# Patient Record
Sex: FEMALE | Race: OTHER | NOT HISPANIC OR LATINO | URBAN - METROPOLITAN AREA
[De-identification: names, ages, dates, MRNs, and addresses within clinical notes are randomized per-mention and may not be internally consistent; named-entity substitution may affect disease eponyms.]

---

## 2019-05-17 ENCOUNTER — EMERGENCY (EMERGENCY)
Facility: HOSPITAL | Age: 79
LOS: 1 days | Discharge: HOME | End: 2019-05-17
Attending: EMERGENCY MEDICINE | Admitting: EMERGENCY MEDICINE
Payer: MEDICARE

## 2019-05-17 VITALS
RESPIRATION RATE: 16 BRPM | SYSTOLIC BLOOD PRESSURE: 125 MMHG | HEIGHT: 58 IN | OXYGEN SATURATION: 95 % | WEIGHT: 139.99 LBS | HEART RATE: 59 BPM | TEMPERATURE: 98 F | DIASTOLIC BLOOD PRESSURE: 69 MMHG

## 2019-05-17 PROCEDURE — 99282 EMERGENCY DEPT VISIT SF MDM: CPT

## 2019-05-17 RX ORDER — ACETAMINOPHEN 500 MG
2 TABLET ORAL
Qty: 60 | Refills: 0
Start: 2019-05-17 | End: 2019-05-26

## 2019-05-17 RX ORDER — ACETAMINOPHEN 500 MG
650 TABLET ORAL ONCE
Refills: 0 | Status: COMPLETED | OUTPATIENT
Start: 2019-05-17 | End: 2019-05-17

## 2019-05-17 RX ORDER — SIMVASTATIN 20 MG/1
1 TABLET, FILM COATED ORAL
Qty: 0 | Refills: 0 | DISCHARGE

## 2019-05-17 RX ORDER — ASPIRIN/CALCIUM CARB/MAGNESIUM 324 MG
1 TABLET ORAL
Qty: 0 | Refills: 0 | DISCHARGE

## 2019-05-17 RX ADMIN — Medication 650 MILLIGRAM(S): at 15:32

## 2019-05-17 RX ADMIN — Medication 650 MILLIGRAM(S): at 15:15

## 2019-05-17 NOTE — ED ADULT TRIAGE NOTE - CHIEF COMPLAINT QUOTE
Pt w hx of arthritis c/o worsening B/L knee pain, R >L. Per daughter pt has been receiving injections , last one last month "but they don't work any longer'

## 2019-05-17 NOTE — ED PROVIDER NOTE - NSFOLLOWUPINSTRUCTIONS_ED_ALL_ED_FT
Osteoarthritis  Image   Osteoarthritis is a type of arthritis that affects tissue that covers the ends of bones in joints (cartilage). Cartilage acts as a cushion between the bones and helps them move smoothly. Osteoarthritis results when cartilage in the joints gets worn down. Osteoarthritis is sometimes called "wear and tear" arthritis.    Osteoarthritis is the most common form of arthritis. It often occurs in older people. It is a condition that gets worse over time (a progressive condition). Joints that are most often affected by this condition are in:  Fingers.  Toes.  Hips.  Knees.  Spine, including neck and lower back.  What are the causes?  This condition is caused by age-related wearing down of cartilage that covers the ends of bones.    What increases the risk?  The following factors may make you more likely to develop this condition:  Older age.  Being overweight or obese.  Overuse of joints, such as in athletes.  Past injury of a joint.  Past surgery on a joint.  Family history of osteoarthritis.  What are the signs or symptoms?  The main symptoms of this condition are pain, swelling, and stiffness in the joint. The joint may lose its shape over time. Small pieces of bone or cartilage may break off and float inside of the joint, which may cause more pain and damage to the joint. Small deposits of bone (osteophytes) may grow on the edges of the joint. Other symptoms may include:  A grating or scraping feeling inside the joint when you move it.  Popping or creaking sounds when you move.  Symptoms may affect one or more joints. Osteoarthritis in a major joint, such as your knee or hip, can make it painful to walk or exercise. If you have osteoarthritis in your hands, you might not be able to  items, twist your hand, or control small movements of your hands and fingers (fine motor skills).    How is this diagnosed?  This condition may be diagnosed based on:  Your medical history.  A physical exam.  Your symptoms.  X-rays of the affected joint(s).  Blood tests to rule out other types of arthritis.  How is this treated?  There is no cure for this condition, but treatment can help to control pain and improve joint function. Treatment plans may include:  A prescribed exercise program that allows for rest and joint relief. You may work with a physical therapist.  A weight control plan.  Pain relief techniques, such as:  Applying heat and cold to the joint.  Electric pulses delivered to nerve endings under the skin (transcutaneous electrical nerve stimulation, or TENS).  Massage.  Certain nutritional supplements.  NSAIDs or prescription medicines to help relieve pain.  Medicine to help relieve pain and inflammation (corticosteroids). This can be given by mouth (orally) or as an injection.  Assistive devices, such as a brace, wrap, splint, specialized glove, or cane.  Surgery, such as:  An osteotomy. This is done to reposition the bones and relieve pain or to remove loose pieces of bone and cartilage.  Joint replacement surgery. You may need this surgery if you have very bad (advanced) osteoarthritis.  Follow these instructions at home:  Activity     Rest your affected joints as directed by your health care provider.  Do not drive or use heavy machinery while taking prescription pain medicine.  Exercise as directed. Your health care provider or physical therapist may recommend specific types of exercise, such as:  Strengthening exercises. These are done to strengthen the muscles that support joints that are affected by arthritis. They can be performed with weights or with exercise bands to add resistance.  Aerobic activities. These are exercises, such as brisk walking or water aerobics, that get your heart pumping.  Range-of-motion activities. These keep your joints easy to move.  Balance and agility exercises.  Managing pain, stiffness, and swelling     Image Image   If directed, apply heat to the affected area as often as told by your health care provider. Use the heat source that your health care provider recommends, such as a moist heat pack or a heating pad.  If you have a removable assistive device, remove it as told by your health care provider.  Place a towel between your skin and the heat source. If your health care provider tells you to keep the assistive device on while you apply heat, place a towel between the assistive device and the heat source.  Leave the heat on for 20–30 minutes.  Remove the heat if your skin turns bright red. This is especially important if you are unable to feel pain, heat, or cold. You may have a greater risk of getting burned.  If directed, put ice on the affected joint:  If you have a removable assistive device, remove it as told by your health care provider.  Put ice in a plastic bag.  Place a towel between your skin and the bag. If your health care provider tells you to keep the assistive device on during icing, place a towel between the assistive device and the bag.  Leave the ice on for 20 minutes, 2–3 times a day.  General instructions     Take over-the-counter and prescription medicines only as told by your health care provider.  Maintain a healthy weight. Follow instructions from your health care provider for weight control. These may include dietary restrictions.  Do not use any products that contain nicotine or tobacco, such as cigarettes and e-cigarettes. These can delay bone healing. If you need help quitting, ask your health care provider.  Use assistive devices as directed by your health care provider.  Keep all follow-up visits as told by your health care provider. This is important.  Where to find more information  [employer] [place] of Arthritis and Musculoskeletal and Skin Diseases: www.niams.nih.gov  [employer] [place] on Aging: www.zhou.nih.gov  American College of Rheumatology: www.rheumatology.org  Contact a health care provider if:  Your skin turns red.  You develop a rash.  You have pain that gets worse.  You have a fever along with joint or muscle aches.  Get help right away if:  You lose a lot of weight.  You suddenly lose your appetite.  You have night sweats.  Summary  Osteoarthritis is a type of arthritis that affects tissue covering the ends of bones in joints (cartilage).  This condition is caused by age-related wearing down of cartilage that covers the ends of bones.  The main symptom of this condition is pain, swelling, and stiffness in the joint.  There is no cure for this condition, but treatment can help to control pain and improve joint function.  This information is not intended to replace advice given to you by your health care provider. Make sure you discuss any questions you have with your health care provider. Please see referral to orthopedics for followup. Call for appointment.  Return to the ER if symptoms worsen or other concerns.    Osteoarthritis  Image   Osteoarthritis is a type of arthritis that affects tissue that covers the ends of bones in joints (cartilage). Cartilage acts as a cushion between the bones and helps them move smoothly. Osteoarthritis results when cartilage in the joints gets worn down. Osteoarthritis is sometimes called "wear and tear" arthritis.    Osteoarthritis is the most common form of arthritis. It often occurs in older people. It is a condition that gets worse over time (a progressive condition). Joints that are most often affected by this condition are in:  Fingers.  Toes.  Hips.  Knees.  Spine, including neck and lower back.  What are the causes?  This condition is caused by age-related wearing down of cartilage that covers the ends of bones.    What increases the risk?  The following factors may make you more likely to develop this condition:  Older age.  Being overweight or obese.  Overuse of joints, such as in athletes.  Past injury of a joint.  Past surgery on a joint.  Family history of osteoarthritis.  What are the signs or symptoms?  The main symptoms of this condition are pain, swelling, and stiffness in the joint. The joint may lose its shape over time. Small pieces of bone or cartilage may break off and float inside of the joint, which may cause more pain and damage to the joint. Small deposits of bone (osteophytes) may grow on the edges of the joint. Other symptoms may include:  A grating or scraping feeling inside the joint when you move it.  Popping or creaking sounds when you move.  Symptoms may affect one or more joints. Osteoarthritis in a major joint, such as your knee or hip, can make it painful to walk or exercise. If you have osteoarthritis in your hands, you might not be able to  items, twist your hand, or control small movements of your hands and fingers (fine motor skills).    How is this diagnosed?  This condition may be diagnosed based on:  Your medical history.  A physical exam.  Your symptoms.  X-rays of the affected joint(s).  Blood tests to rule out other types of arthritis.  How is this treated?  There is no cure for this condition, but treatment can help to control pain and improve joint function. Treatment plans may include:  A prescribed exercise program that allows for rest and joint relief. You may work with a physical therapist.  A weight control plan.  Pain relief techniques, such as:  Applying heat and cold to the joint.  Electric pulses delivered to nerve endings under the skin (transcutaneous electrical nerve stimulation, or TENS).  Massage.  Certain nutritional supplements.  NSAIDs or prescription medicines to help relieve pain.  Medicine to help relieve pain and inflammation (corticosteroids). This can be given by mouth (orally) or as an injection.  Assistive devices, such as a brace, wrap, splint, specialized glove, or cane.  Surgery, such as:  An osteotomy. This is done to reposition the bones and relieve pain or to remove loose pieces of bone and cartilage.  Joint replacement surgery. You may need this surgery if you have very bad (advanced) osteoarthritis.  Follow these instructions at home:  Activity     Rest your affected joints as directed by your health care provider.  Do not drive or use heavy machinery while taking prescription pain medicine.  Exercise as directed. Your health care provider or physical therapist may recommend specific types of exercise, such as:  Strengthening exercises. These are done to strengthen the muscles that support joints that are affected by arthritis. They can be performed with weights or with exercise bands to add resistance.  Aerobic activities. These are exercises, such as brisk walking or water aerobics, that get your heart pumping.  Range-of-motion activities. These keep your joints easy to move.  Balance and agility exercises.  Managing pain, stiffness, and swelling     Image Image   If directed, apply heat to the affected area as often as told by your health care provider. Use the heat source that your health care provider recommends, such as a moist heat pack or a heating pad.  If you have a removable assistive device, remove it as told by your health care provider.  Place a towel between your skin and the heat source. If your health care provider tells you to keep the assistive device on while you apply heat, place a towel between the assistive device and the heat source.  Leave the heat on for 20–30 minutes.  Remove the heat if your skin turns bright red. This is especially important if you are unable to feel pain, heat, or cold. You may have a greater risk of getting burned.  If directed, put ice on the affected joint:  If you have a removable assistive device, remove it as told by your health care provider.  Put ice in a plastic bag.  Place a towel between your skin and the bag. If your health care provider tells you to keep the assistive device on during icing, place a towel between the assistive device and the bag.  Leave the ice on for 20 minutes, 2–3 times a day.  General instructions     Take over-the-counter and prescription medicines only as told by your health care provider.  Maintain a healthy weight. Follow instructions from your health care provider for weight control. These may include dietary restrictions.  Do not use any products that contain nicotine or tobacco, such as cigarettes and e-cigarettes. These can delay bone healing. If you need help quitting, ask your health care provider.  Use assistive devices as directed by your health care provider.  Keep all follow-up visits as told by your health care provider. This is important.  Where to find more information  National Exchange of Arthritis and Musculoskeletal and Skin Diseases: www.niams.nih.gov  National Exchange on Aging: www.zhou.nih.gov  American College of Rheumatology: www.rheumatology.org  Contact a health care provider if:  Your skin turns red.  You develop a rash.  You have pain that gets worse.  You have a fever along with joint or muscle aches.  Get help right away if:  You lose a lot of weight.  You suddenly lose your appetite.  You have night sweats.  Summary  Osteoarthritis is a type of arthritis that affects tissue covering the ends of bones in joints (cartilage).  This condition is caused by age-related wearing down of cartilage that covers the ends of bones.  The main symptom of this condition is pain, swelling, and stiffness in the joint.  There is no cure for this condition, but treatment can help to control pain and improve joint function.  This information is not intended to replace advice given to you by your health care provider. Make sure you discuss any questions you have with your health care provider.

## 2019-05-17 NOTE — ED ADULT NURSE NOTE - NSIMPLEMENTINTERV_GEN_ALL_ED
Implemented All Universal Safety Interventions:  [place] to call system. Call bell, personal items and telephone within reach. Instruct patient to call for assistance. Room bathroom lighting operational. Non-slip footwear when patient is off stretcher. Physically safe environment: no spills, clutter or unnecessary equipment. Stretcher in lowest position, wheels locked, appropriate side rails in place.

## 2019-05-17 NOTE — ED PROVIDER NOTE - OBJECTIVE STATEMENT
Patient is a 77yo F with a PMHx of HTN, HLD, OA who presented to the ED complaining of b/l knee pain, R>L. Patient had been following an orthopedist for many years for OA and was treated with multiple rounds of steroid injections, the last one roughly 1 month ago with waning response to therapy, with relief now lasting as little as two weeks. Patient had one fall three months ago where she sustained an L3-L4 compression fracture and sees Dr. Arturo Sarah, an orthopedist. Denies any more recent falls, lower extremity numbness or tingling, loss of bladder or bowel continence. She has been ambulating minimally with a cane though notes her ambulation is significantly limited by pain. She denies pain in the ankles, hips, or any other large joints. No pain or swelling of the hands. No erythema or swelling of the knees, no skin rashes, no fevers or chills, no orthopnea.

## 2019-05-17 NOTE — ED ADULT NURSE REASSESSMENT NOTE - NS ED NURSE REASSESS COMMENT FT1
Acetaminophen 650mg PO admisnitered w/ good effects, ambujlates w/ use of cane, vital signs stable, discharged to home in stable condition.

## 2019-05-17 NOTE — ED PROVIDER NOTE - CARE PROVIDER_API CALL
Arturo Sarah)  Neurology; Pain Medicine  [address] [address] [place] [address]  Phone: [phone]  Fax: [phone]  Follow Up Time:     Robb Izaguirre)  Internal Medicine  [address] [place] [address]  Phone: [phone]  Fax: [phone]  Follow Up Time: Arturo Sarah)  Neurology; Pain Medicine  [address] [address] [place] [address]  Phone: [phone]  Fax: [phone]  Follow Up Time:     Robb Izaguirre)  Internal Medicine  [address] [place] [address]  Phone: [phone]  Fax: [phone]  Follow Up Time:     Alfredo Hull)  Orthopaedic Surgery  [address] [place] [address]  Phone: [phone]  Fax: [phone]  Follow Up Time:

## 2019-05-17 NOTE — ED PROVIDER NOTE - PROVIDER TOKENS
PROVIDER:[TOKEN:[[id number]]],PROVIDER:[TOKEN:[[id number]]] PROVIDER:[TOKEN:[[id number]]],PROVIDER:[TOKEN:[[id number]]],PROVIDER:[TOKEN:[[id number]]]

## 2019-05-17 NOTE — ED ADULT NURSE NOTE - OBJECTIVE STATEMENT
Patient c/o of bilateral knee pain X 1 month, more on the right knee, ambulates w/ cane w' increasing difficulty, states "injections used to work, no longer working".

## 2019-05-17 NOTE — ED PROVIDER NOTE - ATTENDING CONTRIBUTION TO CARE
exacerbation of chronic knee pain.  no redness or fever to suggest infection.  no trauma to suggest fracture.  suspect djd.  able to ambulate.  will refer to ortho outpatient for further management

## 2019-05-17 NOTE — ED PROVIDER NOTE - CLINICAL SUMMARY MEDICAL DECISION MAKING FREE TEXT BOX
79yo Kinyarwanda speaking F presenting with b/l knee pain..... 77yo Latvian speaking F presenting with b/l knee pain for many months with waning response to intraarticular injections, no inciting injury or trauma. Likely worsening of OA. Patient to be DC'ed with Rx for Tylenol and outpatient orthopedic follow-up for evaluation.

## 2019-05-17 NOTE — ED PROVIDER NOTE - CARE PROVIDERS DIRECT ADDRESSES
yes ,DirectAddress_Unknown,DirectAddress_Unknown ,DirectAddress_Unknown,DirectAddress_Unknown,[email or web address]

## 2019-05-21 DIAGNOSIS — I10 ESSENTIAL (PRIMARY) HYPERTENSION: ICD-10-CM

## 2019-05-21 DIAGNOSIS — M25.561 PAIN IN RIGHT KNEE: ICD-10-CM

## 2019-05-21 DIAGNOSIS — Z79.899 OTHER LONG TERM (CURRENT) DRUG THERAPY: ICD-10-CM

## 2019-05-21 DIAGNOSIS — E78.5 HYPERLIPIDEMIA, UNSPECIFIED: ICD-10-CM

## 2019-05-21 DIAGNOSIS — M25.562 PAIN IN LEFT KNEE: ICD-10-CM

## 2019-05-21 DIAGNOSIS — Z79.82 LONG TERM (CURRENT) USE OF ASPIRIN: ICD-10-CM

## 2019-06-07 PROBLEM — Z00.00 ENCOUNTER FOR PREVENTIVE HEALTH EXAMINATION: Status: ACTIVE | Noted: 2019-06-07
